# Patient Record
Sex: FEMALE | Race: WHITE | NOT HISPANIC OR LATINO | Employment: UNEMPLOYED | URBAN - METROPOLITAN AREA
[De-identification: names, ages, dates, MRNs, and addresses within clinical notes are randomized per-mention and may not be internally consistent; named-entity substitution may affect disease eponyms.]

---

## 2017-01-30 DIAGNOSIS — Z12.31 ENCOUNTER FOR SCREENING MAMMOGRAM FOR MALIGNANT NEOPLASM OF BREAST: ICD-10-CM

## 2017-01-31 ENCOUNTER — ALLSCRIPTS OFFICE VISIT (OUTPATIENT)
Dept: OTHER | Facility: OTHER | Age: 47
End: 2017-01-31

## 2017-01-31 ENCOUNTER — LAB REQUISITION (OUTPATIENT)
Dept: LAB | Facility: HOSPITAL | Age: 47
End: 2017-01-31
Payer: COMMERCIAL

## 2017-01-31 DIAGNOSIS — Z01.419 ENCOUNTER FOR GYNECOLOGICAL EXAMINATION WITHOUT ABNORMAL FINDING: ICD-10-CM

## 2017-01-31 PROCEDURE — G0145 SCR C/V CYTO,THINLAYER,RESCR: HCPCS | Performed by: NURSE PRACTITIONER

## 2017-01-31 PROCEDURE — 87624 HPV HI-RISK TYP POOLED RSLT: CPT | Performed by: NURSE PRACTITIONER

## 2017-02-03 LAB — HPV RRNA GENITAL QL NAA+PROBE: ABNORMAL

## 2017-02-04 LAB
LAB AP GYN PRIMARY INTERPRETATION: NORMAL
LAB AP LMP: NORMAL
Lab: NORMAL
PATH INTERP SPEC-IMP: NORMAL

## 2017-02-10 ENCOUNTER — GENERIC CONVERSION - ENCOUNTER (OUTPATIENT)
Dept: OTHER | Facility: OTHER | Age: 47
End: 2017-02-10

## 2017-02-15 ENCOUNTER — GENERIC CONVERSION - ENCOUNTER (OUTPATIENT)
Dept: OTHER | Facility: OTHER | Age: 47
End: 2017-02-15

## 2017-03-09 ENCOUNTER — ALLSCRIPTS OFFICE VISIT (OUTPATIENT)
Dept: OTHER | Facility: OTHER | Age: 47
End: 2017-03-09

## 2017-03-09 PROCEDURE — 88305 TISSUE EXAM BY PATHOLOGIST: CPT | Performed by: NURSE PRACTITIONER

## 2017-03-09 PROCEDURE — 88344 IMHCHEM/IMCYTCHM EA MLT ANTB: CPT | Performed by: NURSE PRACTITIONER

## 2017-03-10 ENCOUNTER — LAB REQUISITION (OUTPATIENT)
Dept: LAB | Facility: HOSPITAL | Age: 47
End: 2017-03-10
Payer: COMMERCIAL

## 2017-03-10 DIAGNOSIS — R87.610 ATYPICAL SQUAMOUS CELLS OF UNDETERMINED SIGNIFICANCE ON CYTOLOGIC SMEAR OF CERVIX (ASC-US): ICD-10-CM

## 2017-03-22 ENCOUNTER — ALLSCRIPTS OFFICE VISIT (OUTPATIENT)
Dept: OTHER | Facility: OTHER | Age: 47
End: 2017-03-22

## 2017-03-24 ENCOUNTER — GENERIC CONVERSION - ENCOUNTER (OUTPATIENT)
Dept: OTHER | Facility: OTHER | Age: 47
End: 2017-03-24

## 2018-01-10 NOTE — PROCEDURES
Assessment    1  ASCUS with positive high risk HPV cervical (795 01,795 05) (R87 610,R87 810)    Plan  ASCUS with positive high risk HPV cervical    · (1) TISSUE EXAM; Status:Active - Retrospective Authorization; Requested LGR:[id number];    Perform:St  [address] [place] [address]; JRI:[id number]; Last Updated By:Bela Coleman; 3/9/2017 12:03:43 PM;Ordered; For:ASCUS with positive high risk HPV cervical; Ordered By:Christiana Cabral;  B : 9 O'CLOCK AREA OF CERVIX  B Date/Time: : [id number]  B : Cervical Biopsy  A : ENDOCERVIX  A Date/Time: : 09PTM8190  A : Endocervical curetting  Impression: : ASCUS (+)HPV       R87 61 , R87 810   · Follow-up visit in 2 weeks Evaluation and Treatment  Follow-up  Status: Hold For -  Scheduling  Requested for: [id number]   Ordered; For: ASCUS with positive high risk HPV cervical; Ordered By: Joel Araujo Performed:  Due: 29AKU8257    Discussion/Summary  Discussion Summary:   We again discussed the abnormal pap smear and the implications of this  She is aware that a biopsy and ECC were done and these results are pending  We did discuss ways she can boost her immune system  Chief Complaint  Pt  presents today for a colposcopy  Active Problems    1  ASCUS with positive high risk HPV cervical (795 01,795 05) (R87 610,R87 810)   2  Encounter for routine gynecological examination with Papanicolaou smear of cervix   (V72 31,V76 2) (Z01 419)   3  Encounter for screening mammogram for malignant neoplasm of breast (V76 12)   (Z12 31)   4  Human papilloma virus infection (079 4) (B97 7)    Current Meds    1  Calcium 600+D TABS; Therapy: (Recorded:31Jan2017) to Recorded   2  Escitalopram Oxalate 10 MG Oral Tablet; Therapy: (Recorded:31Jan2017) to Recorded   3  Fish Oil 1200 MG Oral Capsule; Therapy: (Recorded:31Jan2017) to Recorded   4  Losartan Potassium 50 MG Oral Tablet; Therapy: [id number] to (Last Rx:07Mar2012)  Requested for: 98QDR8199 Ordered    Allergies    1  Sulfa Drugs    Procedure    Procedure: colposcopy  Indication: atypical squamous cells of undetermined significance and PCR positive for high risk HPV  Risks and benefits were discussed with the patient  We discussed possible complications, including infection and bleeding  Written consent was obtained prior to the procedure  The patient was premedicated with None  Procedure Note:   A cervical Pap smear was not performed  The squamocolumnar junction was not fully visualized  After bathing the cervix in acetic acid, evaluation showed acetowhite changes at 9 o'clock  Cervical Biopsy: one biopsies taken of the cervix  the biopsies were taken at 9 o'clock  Endocervical curettage was performed  Hemostasis was obtained with Monsel's solution  Patient Status: the patient tolerated the procedure well  Complications: there were no complications  Future Appointments    Date/Time Provider Specialty Site   96/62/9391 98:50 PM Arlen Zamora [facility]     History of Present Illness  The pt  has had abnormal pap smears since about 11/2013  Her pap smear results keep showing ASCUS with positive "other " HPV  A colposcopy with biopsy was done 2/2015  The biopsy results showed BRANDON 1  She relates that she continues to have increased stress in her life which may be affecting her immune system overall       Review of Systems  Constitutional : denies fever, fatigue, recent weight gain or loss   : as noted in HPI     Signatures   Electronically signed by : Velvet Quiñones; Mar  9 2017 12:12PM EST                       (Author)    Electronically signed by : ALEXA Montoya Mar  9 2017  2:10PM EST

## 2018-01-12 VITALS
DIASTOLIC BLOOD PRESSURE: 80 MMHG | BODY MASS INDEX: 31.98 KG/M2 | SYSTOLIC BLOOD PRESSURE: 124 MMHG | HEIGHT: 66 IN | WEIGHT: 199 LBS

## 2018-01-13 VITALS
SYSTOLIC BLOOD PRESSURE: 124 MMHG | HEIGHT: 66 IN | BODY MASS INDEX: 31.98 KG/M2 | DIASTOLIC BLOOD PRESSURE: 80 MMHG | WEIGHT: 199 LBS

## 2018-01-13 VITALS
HEIGHT: 66 IN | BODY MASS INDEX: 31.98 KG/M2 | SYSTOLIC BLOOD PRESSURE: 124 MMHG | DIASTOLIC BLOOD PRESSURE: 80 MMHG | WEIGHT: 199 LBS

## 2018-01-17 NOTE — RESULT NOTES
Verified Results  (1) THIN PREP PAP WITH IMAGING [id number] 32:14NL Neftali Jackson Order Number: [id number]     Test Name Result Flag Reference   LAB AP CASE REPORT (Report)     Gynecologic Cytology Report            Case: [id number]                  Authorizing Provider: DAVID Aragon    Collected:      01/31/2017 6191        First Screen:     Colleen Jimenez, CT   Received:      02/01/2017 1014        Rescreen:       Michael Lenny, CT                           Pathologist:      Georgina Khan MD                              Specimen:  LIQUID-BASED PAP, SCREENING, Cervix   HPV HIGH RISK RESULT (Report)     HPV, High Risk: HPV POS, HPV16 NEG, HPV18 NEG      Other High Risk HPV Positive, HPV 16 Negative, HPV 18 Negative  Specimen is positive for the DNA of any one of, or combination of the following high risk HPV types: 11,99,32,80,71,82,82,00,29,23,63,30  HPV types 16 and 18 DNA were undetectable or below the pre-set threshold  [employer]?s FDA approved Neris 4800 is utilized with strict adherence to the ?s instruction  manual to test for the presence of High-Risk HPV DNA, as well as HPV 16 and HPV 18  This instrument  has been validated by our laboratory and/or by the   A negative result does not preclude the presence of HPV infection because results depend on adequate  specimen collection, absence of inhibitors and sufficient DNA to be detected  Additionally, HPV negative  results are not intended to prevent women from proceeding to colposcopy if clinically warranted  Positive HPV test results indicate the presence of any one or more of the high risk types, but since patients  are often co-infected with low-risk types it does not rule out the presence of low-risk types in patients  with mixed infections     LAB AP GYN PRIMARY INTERPRETATION      Epithelial cell abnormality  Electronically signed by Georgina Khan MD on 2/4/2017 at 1:15 PM   LAB AP GYN INTERPRETATION      Atypical squamous cells of undetermined significance   LAB AP GYN SPECIMEN ADEQUACY      Satisfactory for evaluation  Endocervical/transformation zone component present  LAB AP GYN ADDITIONAL INFORMATION (Report)     [employer]'s FDA approved ,  and ThinPrep Imaging System are   utilized with strict adherence to the 's instruction manual to   prepare gynecologic and non-gynecologic cytology specimens for the   production of ThinPrep slides as well as for gynecologic ThinPrep imaging  These processes have been validated by our laboratory and/or by the     The Pap test is not a diagnostic procedure and should not be used as the   sole means to detect cervical cancer  It is only a screening procedure to   aid in the detection of cervical cancer and its precursors  Both   false-negative and false-positive results have been experienced  Your   patient's test result should be interpreted in this context together with   the history and clinical findings  LAB AP LMP 1/11/2017     Performing Comments: CERVICAL - ENDOCERVICAL  ROUTINE PAP   COTEST REGARDLESS   LAB AP CASE REPORT (Report)     Gynecologic Cytology Report            Case: [id number]                  Authorizing Provider: DAVID Anidno    Collected:      01/31/2017 1326        First Screen:     JANNET Clement   Received:      02/01/2017 1014        Rescreen:       JANNET Joyner                           Pathologist:      Jaclyn Ro MD                              Specimen:  LIQUID-BASED PAP, SCREENING, Cervix   HPV HIGH RISK RESULT (Report)     HPV, High Risk: HPV POS, HPV16 NEG, HPV18 NEG      Other High Risk HPV Positive, HPV 16 Negative, HPV 18 Negative  Specimen is positive for the DNA of any one of, or combination of the following high risk HPV types: 59,30,14,18,09,34,24,11,40,25,75,09    HPV types 16 and 18 DNA were undetectable or below the pre-set threshold  [employer]?s FDA approved Neris 4800 is utilized with strict adherence to the ?s instruction  manual to test for the presence of High-Risk HPV DNA, as well as HPV 16 and HPV 18  This instrument  has been validated by our laboratory and/or by the   A negative result does not preclude the presence of HPV infection because results depend on adequate  specimen collection, absence of inhibitors and sufficient DNA to be detected  Additionally, HPV negative  results are not intended to prevent women from proceeding to colposcopy if clinically warranted  Positive HPV test results indicate the presence of any one or more of the high risk types, but since patients  are often co-infected with low-risk types it does not rule out the presence of low-risk types in patients  with mixed infections  LAB AP GYN PRIMARY INTERPRETATION      Epithelial cell abnormality  Electronically signed by Prema Monte MD on 2/4/2017 at 1:15 PM   LAB AP GYN INTERPRETATION      Atypical squamous cells of undetermined significance   LAB AP GYN SPECIMEN ADEQUACY      Satisfactory for evaluation  Endocervical/transformation zone component present  LAB AP GYN ADDITIONAL INFORMATION (Report)     [employer]'s FDA approved ,  and ThinPrep Imaging System are   utilized with strict adherence to the 's instruction manual to   prepare gynecologic and non-gynecologic cytology specimens for the   production of ThinPrep slides as well as for gynecologic ThinPrep imaging  These processes have been validated by our laboratory and/or by the     The Pap test is not a diagnostic procedure and should not be used as the   sole means to detect cervical cancer  It is only a screening procedure to   aid in the detection of cervical cancer and its precursors  Both   false-negative and false-positive results have been experienced   Your   patient's test result should be interpreted in this context together with   the history and clinical findings     LAB AP LMP 1/11/2017     Performing Comments: CERVICAL - ENDOCERVICAL  ROUTINE PAP   COTEST REGARDLESS                   Plan  Encounter for screening mammogram for malignant neoplasm of breast    · MAMMO SCREENING BILATERAL W 3D & CAD; Status:Hold For - Scheduling; Requested  for:30Jan2017;

## 2018-01-18 NOTE — RESULT NOTES
Verified Results  (1) TISSUE EXAM [id number] 56:52WX Lucius Janice     Test Name Result Flag Reference   LAB AP CASE REPORT (Report)     Surgical Pathology Report             Case: [id number]                   Authorizing Provider: DAVID Romeo    Collected:      03/09/2017           Pathologist:      Rupert Justice MD       Received:      03/10/2017 1520        Specimens:  A) - Endocervical, ECC                                         B) - Cervix, 9 o'clock   LAB AP FINAL DIAGNOSIS (Report)     A  Endocervix (curetting):    - Scant benign endocervical glandular and ectocervical squamous mucosa  - Background of mucin noted  - No dysplasia or malignancy identified  B  9:00, cervix (biopsy):    - Transition zone mucosa with focal mild squamous atypia, cannot   exclude low-grade squamous intraepithelial lesion (BRANDON-1)  - Background of focal acute cervicitis  - No high-grade dysplasia or malignancy identified  Electronically signed by Rupert Justice MD on 3/16/2017 at 12:02 PM  Preliminary result electronically signed by Rupert Justice MD on 3/15/2017 at 9:46 AM   LAB AP MICROSCOPIC DESCRIPTION      - Immunohistochemical studies (with appropriate controls) demonstrate:    - Combined P16 / Ki-67 stain performed on the 9:00 cervical biopsy   (part B) shows weak / patchy P16 expression with mild to moderate Ki-67   proliferation  LAB AP NOTE      Interpretation performed at [facility], [address] [facility], [address] [facility] [facility]  LAB AP SURGICAL ADDITIONAL INFORMATION (Report)     These tests were developed and their performance characteristics   determined by [facility] or [facility]  They may not be cleared or approved by the [place]  Food and   Drug Administration  The FDA has determined that such clearance or   approval is not necessary  These tests are used for clinical purposes  They should not be regarded as investigational or for research   This   laboratory has been approved by [facility], designated as a high-complexity   laboratory and is qualified to perform these tests  LAB AP GROSS DESCRIPTION (Report)     A  The specimen is received in formalin, labeled with the patient's name   and hospital number, and is designated endocervical curettings  The   specimen consists of multiple red to brown soft and mucinous tissue   fragments measuring in aggregate 2 5 x 2 2 x 0 3 cm  Entirely submitted  One cassette  B  The specimen is received in formalin, labeled with the patient's name   and hospital number, and is designated 9 o'clock area of cervix  The   specimen consists of multiple white soft and mucinous tissue fragments   measuring in aggregate 0 8 x 0 5 x 0 2 cm  Entirely submitted  One   cassette  Note: The estimated total formalin fixation time based upon information   provided by the submitting clinician and the standard processing schedule   is over 72 hours      BMV   LAB AP CLINICAL INFORMATION      Atypical squamous cells of undetermined significance on cytologic smear of cervix (ASC-US)

## 2018-02-28 ENCOUNTER — OFFICE VISIT (OUTPATIENT)
Dept: GYNECOLOGY | Facility: CLINIC | Age: 48
End: 2018-02-28
Payer: COMMERCIAL

## 2018-02-28 VITALS
HEIGHT: 65 IN | SYSTOLIC BLOOD PRESSURE: 124 MMHG | DIASTOLIC BLOOD PRESSURE: 78 MMHG | BODY MASS INDEX: 31.42 KG/M2 | WEIGHT: 188.6 LBS

## 2018-02-28 DIAGNOSIS — Z12.31 ENCOUNTER FOR SCREENING MAMMOGRAM FOR MALIGNANT NEOPLASM OF BREAST: ICD-10-CM

## 2018-02-28 DIAGNOSIS — Z01.419 ENCOUNTER FOR ROUTINE GYNECOLOGICAL EXAMINATION WITH PAPANICOLAOU SMEAR OF CERVIX: Primary | ICD-10-CM

## 2018-02-28 PROBLEM — N87.0 CERVICAL INTRAEPITHELIAL NEOPLASIA GRADE 1: Status: ACTIVE | Noted: 2017-03-22

## 2018-02-28 PROCEDURE — 99396 PREV VISIT EST AGE 40-64: CPT | Performed by: NURSE PRACTITIONER

## 2018-02-28 RX ORDER — ESCITALOPRAM OXALATE 10 MG/1
TABLET ORAL
COMMUNITY
Start: 2018-02-26

## 2018-02-28 RX ORDER — AMOXICILLIN 500 MG
CAPSULE ORAL
COMMUNITY

## 2018-02-28 RX ORDER — LOSARTAN POTASSIUM 25 MG/1
25 TABLET ORAL
COMMUNITY
Start: 2018-02-26 | End: 2022-06-02

## 2018-02-28 NOTE — PROGRESS NOTES
Reynold Finn is a 50 y o  female who presents for her annual exam  Periods are irregular, lasting 4 days  Her LMP was 12/22/2017 and PLMP was 8/2017  Dysmenorrhea:none  Cyclic symptoms include none  No intermenstrual bleeding, spotting, or discharge  She denies being sexually active at the present time  Current contraception: none  History of abnormal Pap smear: yes - approx  2013 - ASCUS positive HPV  Family history of breast cancer: no  Past Medical History:   Diagnosis Date    ASCUS with positive high risk HPV cervical      No family history on file  Past Surgical History:   Procedure Laterality Date    COLPOSCOPY      TOTAL HIP ARTHROPLASTY       Social History     Social History    Marital status: /Civil Union     Spouse name: N/A    Number of children: N/A    Years of education: N/A     Occupational History    Not on file  Social History Main Topics    Smoking status: Current Every Day Smoker    Smokeless tobacco: Never Used    Alcohol use Yes      Comment: SOCIAL    Drug use: No    Sexual activity: Not on file     Other Topics Concern    Not on file     Social History Narrative    No narrative on file       Current Outpatient Prescriptions:     escitalopram (LEXAPRO) 10 mg tablet, TAKE 1 TABLET (10 MG TOTAL) BY MOUTH DAILY  , Disp: , Rfl:     losartan (COZAAR) 25 mg tablet, Take 25 mg by mouth, Disp: , Rfl:     Calcium Carb-Cholecalciferol 600-800 MG-UNIT TABS, Take by mouth, Disp: , Rfl:     Omega-3 Fatty Acids (FISH OIL) 1200 MG CAPS, Take by mouth, Disp: , Rfl:       Review of Systems  Constitutional :no fever, feels well, no tiredness, no recent weight gain or loss  Cardiovascular: no complaints of slow or fast heart beat, no chest pain, no palpitations  Respiratory: no complaints of shortness of shortness of breath, no SIMS  Breasts:no complaints of breast pain, breast lump, or nipple discharge  Gastrointestinal: no complaints of abdominal pain, constipation,nause, vomiting, or diarrhea or bloody stools  Genitourinary : no complaints of dysuria, incontinence, pelvic pain, dysmenorrhea,vaginal discharge or abnormal vaginal bleeding  Reports irregular menses over the past year  Integumentary: no complaints of skin rash or lesion,itching or dry skin  Neurological: no complaints of headache,numbness, tingling, dizziness or fainting       Objective      /78   Ht 5' 5 2" (1 656 m)   Wt 85 5 kg (188 lb 9 6 oz)   BMI 31 19 kg/m²     General:   appears stated age","cooperative","alert" normal mood and affect   Neck: Neck: normal, supple,trachea midline, no masses   Heart: regular rate and rhythm, S1, S2 normal, no murmur, click, rub or gallop   Lungs: clear to auscultation bilaterally   Breasts: Breast exam :normal, no dimpling or skin changes noted   Abdomen: soft, non-tender, without masses or organomegaly   Vulva: Normal , no lesions   Vagina: normal , no lesions or dryness   Urethra: normal   Cervix: Normal, no palpable masses A pap smear was done   Uterus: Normal , non-tender,not enlarged,no palpable masses   Adnexa: Normal, non-tender without fullness or masses                         Assessment     Normal GYN exam     Plan      All questions answered  Await pap smear results  Breast self exam technique reviewed and patient encouraged to perform self-exam monthly  Dietary diary  Mammogram   Thin prep Pap smear  We discussed her irregular menses and perimenopause vs  menopause and what to expect  She will continue to track her menses and WCB with any unusual bleeding or sx  no

## 2018-03-02 LAB
CLINICAL INFO: NORMAL
CYTO CVX: NORMAL
DATE PREVIOUS BX: NORMAL
LMP START DATE: NORMAL
SL AMB PREV. PAP:: NORMAL
SPECIMEN SOURCE CVX/VAG CYTO: NORMAL

## 2019-04-02 DIAGNOSIS — Z12.31 ENCOUNTER FOR SCREENING MAMMOGRAM FOR MALIGNANT NEOPLASM OF BREAST: Primary | ICD-10-CM

## 2019-04-03 ENCOUNTER — ANNUAL EXAM (OUTPATIENT)
Dept: GYNECOLOGY | Facility: CLINIC | Age: 49
End: 2019-04-03
Payer: COMMERCIAL

## 2019-04-03 VITALS
HEART RATE: 68 BPM | SYSTOLIC BLOOD PRESSURE: 124 MMHG | DIASTOLIC BLOOD PRESSURE: 82 MMHG | BODY MASS INDEX: 26.61 KG/M2 | WEIGHT: 165.6 LBS | HEIGHT: 66 IN

## 2019-04-03 DIAGNOSIS — Z01.419 ENCOUNTER FOR GYNECOLOGICAL EXAMINATION (GENERAL) (ROUTINE) WITHOUT ABNORMAL FINDINGS: Primary | ICD-10-CM

## 2019-04-03 DIAGNOSIS — Z12.31 ENCOUNTER FOR SCREENING MAMMOGRAM FOR MALIGNANT NEOPLASM OF BREAST: ICD-10-CM

## 2019-04-03 DIAGNOSIS — Z12.4 ENCOUNTER FOR PAPANICOLAOU SMEAR FOR CERVICAL CANCER SCREENING: ICD-10-CM

## 2019-04-03 PROCEDURE — 87624 HPV HI-RISK TYP POOLED RSLT: CPT | Performed by: OBSTETRICS & GYNECOLOGY

## 2019-04-03 PROCEDURE — S0612 ANNUAL GYNECOLOGICAL EXAMINA: HCPCS | Performed by: OBSTETRICS & GYNECOLOGY

## 2019-04-03 PROCEDURE — G0145 SCR C/V CYTO,THINLAYER,RESCR: HCPCS | Performed by: OBSTETRICS & GYNECOLOGY

## 2019-04-04 LAB
HPV HR 12 DNA CVX QL NAA+PROBE: NEGATIVE
HPV16 DNA CVX QL NAA+PROBE: NEGATIVE
HPV18 DNA CVX QL NAA+PROBE: NEGATIVE

## 2019-04-08 LAB
LAB AP GYN PRIMARY INTERPRETATION: NORMAL
Lab: NORMAL

## 2019-04-15 DIAGNOSIS — Z12.31 ENCOUNTER FOR SCREENING MAMMOGRAM FOR MALIGNANT NEOPLASM OF BREAST: ICD-10-CM

## 2020-05-20 ENCOUNTER — ANNUAL EXAM (OUTPATIENT)
Dept: GYNECOLOGY | Facility: CLINIC | Age: 50
End: 2020-05-20
Payer: COMMERCIAL

## 2020-05-20 VITALS
BODY MASS INDEX: 25.49 KG/M2 | SYSTOLIC BLOOD PRESSURE: 110 MMHG | HEIGHT: 66 IN | WEIGHT: 158.6 LBS | DIASTOLIC BLOOD PRESSURE: 72 MMHG | HEART RATE: 76 BPM

## 2020-05-20 DIAGNOSIS — Z12.31 SCREENING MAMMOGRAM, ENCOUNTER FOR: ICD-10-CM

## 2020-05-20 DIAGNOSIS — Z01.411 ENCOUNTER FOR GYNECOLOGICAL EXAMINATION (GENERAL) (ROUTINE) WITH ABNORMAL FINDINGS: Primary | ICD-10-CM

## 2020-05-20 DIAGNOSIS — D25.9 UTERINE LEIOMYOMA, UNSPECIFIED LOCATION: ICD-10-CM

## 2020-05-20 DIAGNOSIS — Z12.4 ENCOUNTER FOR PAPANICOLAOU SMEAR FOR CERVICAL CANCER SCREENING: ICD-10-CM

## 2020-05-20 PROCEDURE — G0145 SCR C/V CYTO,THINLAYER,RESCR: HCPCS | Performed by: OBSTETRICS & GYNECOLOGY

## 2020-05-20 PROCEDURE — S0612 ANNUAL GYNECOLOGICAL EXAMINA: HCPCS | Performed by: OBSTETRICS & GYNECOLOGY

## 2020-05-20 PROCEDURE — 87624 HPV HI-RISK TYP POOLED RSLT: CPT | Performed by: OBSTETRICS & GYNECOLOGY

## 2020-05-20 RX ORDER — DIPHENOXYLATE HYDROCHLORIDE AND ATROPINE SULFATE 2.5; .025 MG/1; MG/1
1 TABLET ORAL DAILY
COMMUNITY

## 2020-05-29 LAB
LAB AP GYN PRIMARY INTERPRETATION: NORMAL
Lab: NORMAL

## 2020-06-17 ENCOUNTER — TELEPHONE (OUTPATIENT)
Dept: GYNECOLOGY | Facility: CLINIC | Age: 50
End: 2020-06-17

## 2020-06-17 NOTE — TELEPHONE ENCOUNTER
Called patient to determine if she had TVU done  She was in a store and could not talk  She will call back when she can

## 2020-07-22 ENCOUNTER — HOSPITAL ENCOUNTER (OUTPATIENT)
Dept: ULTRASOUND IMAGING | Facility: MEDICAL CENTER | Age: 50
Discharge: HOME | End: 2020-07-22
Payer: COMMERCIAL

## 2020-07-22 DIAGNOSIS — D25.9 UTERINE LEIOMYOMA, UNSPECIFIED LOCATION: ICD-10-CM

## 2020-07-22 PROCEDURE — 76830 TRANSVAGINAL US NON-OB: CPT

## 2020-07-22 PROCEDURE — 76856 US EXAM PELVIC COMPLETE: CPT

## 2020-10-14 DIAGNOSIS — Z12.31 SCREENING MAMMOGRAM, ENCOUNTER FOR: ICD-10-CM

## 2021-04-13 DIAGNOSIS — Z23 ENCOUNTER FOR IMMUNIZATION: ICD-10-CM

## 2021-04-26 ENCOUNTER — IMMUNIZATIONS (OUTPATIENT)
Dept: FAMILY MEDICINE CLINIC | Facility: HOSPITAL | Age: 51
End: 2021-04-26

## 2021-04-26 DIAGNOSIS — Z23 ENCOUNTER FOR IMMUNIZATION: Primary | ICD-10-CM

## 2021-04-26 PROCEDURE — 0011A SARS-COV-2 / COVID-19 MRNA VACCINE (MODERNA) 100 MCG: CPT

## 2021-04-26 PROCEDURE — 91301 SARS-COV-2 / COVID-19 MRNA VACCINE (MODERNA) 100 MCG: CPT

## 2021-05-18 NOTE — PROGRESS NOTES
Assessment/Plan:    Benign findings on routine gyn exam  Recommended monthly SBE, annual CBE and annual screening mammo  ASCCP guidelines reviewed and pap with cotesting done today  Colonoscopy noted to be up to date  The patient denies STI risk factors and declines testing at this time  Reviewed diet/activity recommendations Calcium 9479-4783 mg and Vit D 600-1000 IU daily  Discussed postmenopausal considerations and symptoms to report  Kegel exercises as instructed  RTO in one year for routine annual gyn exam or sooner PRN  Diagnoses and all orders for this visit:    Encounter for gynecological examination (general) (routine) without abnormal findings        Subjective:      Patient ID: Jhon Berman is a 46 y o  female  This patient presents for routine annual gyn exam   She denies acute gyn complaints  She denies  bleeding or spotting, VM sx, pelvic pain, dyspareunia, breast concerns, abnormal discharge, bowel/bladder dysfunction  , sexually active and is monogamous  Pap/HPV up to date and normal, 5/20/20  Pap/HPV up to date and normal, 4/3/19  Has hx of ASCUS pap with +HPV in 2017 with BRANDON I on colpo  Pap in 2018 was normal   Mammography up to date and normal, 10/12/20  Colonoscopy up to date, 3/29/19  Due again after 5 years  The following portions of the patient's history were reviewed and updated as appropriate: allergies, current medications, past family history, past medical history, past social history, past surgical history and problem list     Review of Systems   Constitutional: Negative  Respiratory: Negative  Cardiovascular: Negative  Gastrointestinal: Negative  Endocrine: Negative  Genitourinary: Negative for dyspareunia, dysuria, frequency, pelvic pain, urgency, vaginal bleeding, vaginal discharge and vaginal pain  Musculoskeletal: Negative  Skin: Negative  Neurological: Negative  Psychiatric/Behavioral: Negative  Objective:      /82   Ht 5' 5 25" (1 657 m)   Wt 74 2 kg (163 lb 8 oz)   BMI 27 00 kg/m²          Physical Exam  Vitals signs and nursing note reviewed  Exam conducted with a chaperone present  Constitutional:       Appearance: Normal appearance  She is well-developed  HENT:      Head: Normocephalic and atraumatic  Neck:      Musculoskeletal: Normal range of motion and neck supple  Thyroid: No thyroid mass or thyromegaly  Cardiovascular:      Rate and Rhythm: Normal rate and regular rhythm  Heart sounds: Normal heart sounds  Pulmonary:      Effort: Pulmonary effort is normal       Breath sounds: Normal breath sounds  Chest:      Breasts: Breasts are symmetrical          Right: No inverted nipple, mass, nipple discharge, skin change or tenderness  Left: No inverted nipple, mass, nipple discharge, skin change or tenderness  Abdominal:      General: Bowel sounds are normal       Palpations: Abdomen is soft  Tenderness: There is no abdominal tenderness  Hernia: There is no hernia in the left inguinal area or right inguinal area  Genitourinary:     General: Normal vulva  Exam position: Supine  Pubic Area: No rash  Labia:         Right: No rash, tenderness, lesion or injury  Left: No rash, tenderness, lesion or injury  Urethra: No prolapse, urethral pain, urethral swelling or urethral lesion  Vagina: Normal  No signs of injury and foreign body  No vaginal discharge, erythema, tenderness, bleeding, lesions or prolapsed vaginal walls  Cervix: No cervical motion tenderness, discharge, friability, lesion, erythema, cervical bleeding or eversion  Uterus: Not deviated, not enlarged, not fixed, not tender and no uterine prolapse  Adnexa:         Right: No mass, tenderness or fullness  Left: No mass, tenderness or fullness  Rectum: No external hemorrhoid        Comments: Urethra normal without lesions  No bladder tenderness  Musculoskeletal: Normal range of motion  Lymphadenopathy:      Lower Body: No right inguinal adenopathy  No left inguinal adenopathy  Skin:     General: Skin is warm and dry  Neurological:      Mental Status: She is alert and oriented to person, place, and time  Psychiatric:         Speech: Speech normal          Behavior: Behavior normal  Behavior is cooperative

## 2021-05-21 ENCOUNTER — ANNUAL EXAM (OUTPATIENT)
Dept: GYNECOLOGY | Facility: CLINIC | Age: 51
End: 2021-05-21
Payer: COMMERCIAL

## 2021-05-21 VITALS
WEIGHT: 163.5 LBS | SYSTOLIC BLOOD PRESSURE: 112 MMHG | DIASTOLIC BLOOD PRESSURE: 82 MMHG | HEIGHT: 65 IN | BODY MASS INDEX: 27.24 KG/M2

## 2021-05-21 DIAGNOSIS — Z12.4 ENCOUNTER FOR PAPANICOLAOU SMEAR FOR CERVICAL CANCER SCREENING: ICD-10-CM

## 2021-05-21 DIAGNOSIS — Z01.419 ENCOUNTER FOR GYNECOLOGICAL EXAMINATION (GENERAL) (ROUTINE) WITHOUT ABNORMAL FINDINGS: Primary | ICD-10-CM

## 2021-05-21 DIAGNOSIS — Z12.4 ENCOUNTER FOR PAPANICOLAOU SMEAR FOR CERVICAL CANCER SCREENING: Primary | ICD-10-CM

## 2021-05-21 PROCEDURE — G0145 SCR C/V CYTO,THINLAYER,RESCR: HCPCS | Performed by: OBSTETRICS & GYNECOLOGY

## 2021-05-21 PROCEDURE — 1036F TOBACCO NON-USER: CPT | Performed by: OBSTETRICS & GYNECOLOGY

## 2021-05-21 PROCEDURE — 87624 HPV HI-RISK TYP POOLED RSLT: CPT | Performed by: OBSTETRICS & GYNECOLOGY

## 2021-05-21 PROCEDURE — 99396 PREV VISIT EST AGE 40-64: CPT | Performed by: OBSTETRICS & GYNECOLOGY

## 2021-05-21 PROCEDURE — 3008F BODY MASS INDEX DOCD: CPT | Performed by: OBSTETRICS & GYNECOLOGY

## 2021-05-26 LAB
LAB AP GYN PRIMARY INTERPRETATION: NORMAL
Lab: NORMAL

## 2021-06-02 ENCOUNTER — IMMUNIZATIONS (OUTPATIENT)
Dept: FAMILY MEDICINE CLINIC | Facility: HOSPITAL | Age: 51
End: 2021-06-02

## 2021-06-02 DIAGNOSIS — Z23 ENCOUNTER FOR IMMUNIZATION: Primary | ICD-10-CM

## 2021-06-02 PROCEDURE — 91301 SARS-COV-2 / COVID-19 MRNA VACCINE (MODERNA) 100 MCG: CPT

## 2021-06-02 PROCEDURE — 0012A SARS-COV-2 / COVID-19 MRNA VACCINE (MODERNA) 100 MCG: CPT

## 2022-06-01 NOTE — PROGRESS NOTES
Assessment/Plan:    Recommended monthly SBE, annual CBE and annual screening mammo  ASCCP guidelines reviewed and pap with cotesting noted to be up to date; this low risk patient was advised she meets criteria to d/c pap screening at age 70  No pap done today  Colonoscopy noted to be up to date  Reviewed diet/activity recommendations Calcium 8626-3254 mg and Vit D 600-1000 IU daily  Discussed postmenopausal considerations and symptoms to report  Kegel exercises as instructed  RTO in one year for routine annual gyn exam or sooner PRN  Diagnoses and all orders for this visit:    Encounter for gynecological examination (general) (routine) without abnormal findings        Subjective:      Patient ID: Dania Marmolejo is a 45 y o  female  This patient presents for routine annual gyn exam   She denies acute gyn complaints  She denies  bleeding or spotting, VM sx, pelvic pain, dyspareunia, breast concerns, abnormal discharge, bowel/bladder dysfunction  , sexually active and is monogamous  Pap/HPV up to date and normal, 5/21/21, 2020  Jessie Lowe Has hx of ASCUS pap with +HPV in 2017 with BRANDON I on colpo  Mammography up to date and normal, 10/12/20  Colonoscopy up to date, 3/29/19  Due again after 5 years  The following portions of the patient's history were reviewed and updated as appropriate: allergies, current medications, past family history, past medical history, past social history, past surgical history and problem list     Review of Systems   Constitutional: Negative  Respiratory: Negative  Cardiovascular: Negative  Gastrointestinal: Negative  Endocrine: Negative  Genitourinary: Negative for dyspareunia, dysuria, frequency, pelvic pain, urgency, vaginal bleeding, vaginal discharge and vaginal pain  Musculoskeletal: Negative  Skin: Negative  Neurological: Negative  Psychiatric/Behavioral: Negative            Objective:      /78   Pulse 69   Ht 5' 5 25" (1 657 m)   Wt 75 1 kg (165 lb 9 6 oz)   LMP 02/09/2019   BMI 27 35 kg/m²          Physical Exam  Vitals and nursing note reviewed  Exam conducted with a chaperone present  Constitutional:       Appearance: Normal appearance  She is well-developed  HENT:      Head: Normocephalic and atraumatic  Neck:      Thyroid: No thyroid mass or thyromegaly  Cardiovascular:      Rate and Rhythm: Normal rate and regular rhythm  Heart sounds: Normal heart sounds  Pulmonary:      Effort: Pulmonary effort is normal       Breath sounds: Normal breath sounds  Chest:   Breasts: Breasts are symmetrical       Right: No inverted nipple, mass, nipple discharge, skin change or tenderness  Left: No inverted nipple, mass, nipple discharge, skin change or tenderness  Abdominal:      General: Bowel sounds are normal       Palpations: Abdomen is soft  Tenderness: There is no abdominal tenderness  Hernia: There is no hernia in the left inguinal area or right inguinal area  Genitourinary:     General: Normal vulva  Exam position: Supine  Pubic Area: No rash  Labia:         Right: No rash, tenderness, lesion or injury  Left: No rash, tenderness, lesion or injury  Urethra: No prolapse, urethral pain, urethral swelling or urethral lesion  Vagina: Normal  No signs of injury and foreign body  No vaginal discharge, erythema, tenderness, bleeding, lesions or prolapsed vaginal walls  Cervix: No cervical motion tenderness, discharge, friability, lesion, erythema, cervical bleeding or eversion  Uterus: Not deviated, not enlarged, not fixed, not tender and no uterine prolapse  Adnexa:         Right: No mass, tenderness or fullness  Left: No mass, tenderness or fullness  Rectum: No external hemorrhoid  Comments: Urethra normal without lesions  No bladder tenderness  Musculoskeletal:         General: Normal range of motion        Cervical back: Normal range of motion and neck supple  Lymphadenopathy:      Lower Body: No right inguinal adenopathy  No left inguinal adenopathy  Skin:     General: Skin is warm and dry  Neurological:      Mental Status: She is alert and oriented to person, place, and time  Psychiatric:         Speech: Speech normal          Behavior: Behavior normal  Behavior is cooperative

## 2022-06-02 ENCOUNTER — OFFICE VISIT (OUTPATIENT)
Dept: GYNECOLOGY | Facility: CLINIC | Age: 52
End: 2022-06-02
Payer: COMMERCIAL

## 2022-06-02 VITALS
BODY MASS INDEX: 27.59 KG/M2 | HEIGHT: 65 IN | SYSTOLIC BLOOD PRESSURE: 120 MMHG | DIASTOLIC BLOOD PRESSURE: 78 MMHG | HEART RATE: 69 BPM | WEIGHT: 165.6 LBS

## 2022-06-02 DIAGNOSIS — Z01.419 ENCOUNTER FOR GYNECOLOGICAL EXAMINATION (GENERAL) (ROUTINE) WITHOUT ABNORMAL FINDINGS: Primary | ICD-10-CM

## 2022-06-02 PROCEDURE — 99396 PREV VISIT EST AGE 40-64: CPT | Performed by: OBSTETRICS & GYNECOLOGY

## 2023-06-06 NOTE — PROGRESS NOTES
"Assessment/Plan:    Recommended monthly SBE, annual CBE and annual screening mammo  ASCCP guidelines reviewed and pap with cotesting noted to be up to date; this low risk patient was advised she meets criteria to d/c pap screening at age 70  No pap done today  Colonoscopy noted to be up to date  The patient denies STI risk factors and declines testing at this time  Discussed postmenopausal considerations and symptoms to report  Kegel exercises as instructed  RTO in one year for routine annual gyn exam or sooner PRN  Diagnoses and all orders for this visit:    Encounter for gynecological examination (general) (routine) without abnormal findings        Subjective:      Patient ID: Alison Cosme is a 52 y o  female  This patient presents for routine annual gyn exam   She denies  bleeding or spotting, VM sx, pelvic pain, dyspareunia, breast concerns, abnormal discharge, bowel/bladder dysfunction  , sexually active and is monogam  ous  Pap/HPV up to date and normal, 5/21/21, 2020  Has hx of ASCUS pap with +HPV in 2017 with BRANDON I on colpo  Mammography up to date and normal, 10/12/20  Colonoscopy up to date, 3/29/19  Due again after 5 years  The following portions of the patient's history were reviewed and updated as appropriate: allergies, current medications, past family history, past medical history, past social history, past surgical history and problem list     Review of Systems   Constitutional: Negative  Respiratory: Negative  Cardiovascular: Negative  Gastrointestinal: Negative  Endocrine: Negative  Genitourinary: Negative for dyspareunia, dysuria, frequency, pelvic pain, urgency, vaginal bleeding, vaginal discharge and vaginal pain  Musculoskeletal: Negative  Skin: Negative  Neurological: Negative  Psychiatric/Behavioral: Negative            Objective:      /80   Pulse 60   Ht 5' 5 25\" (1 657 m)   Wt 77 6 kg (171 lb)   LMP 02/09/2019   " BMI 28 24 kg/m²          Physical Exam  Vitals and nursing note reviewed  Exam conducted with a chaperone present  Constitutional:       Appearance: Normal appearance  She is well-developed  HENT:      Head: Normocephalic and atraumatic  Neck:      Thyroid: No thyroid mass or thyromegaly  Cardiovascular:      Rate and Rhythm: Normal rate and regular rhythm  Heart sounds: Normal heart sounds  Pulmonary:      Effort: Pulmonary effort is normal       Breath sounds: Normal breath sounds  Chest:   Breasts:     Breasts are symmetrical       Right: No inverted nipple, mass, nipple discharge, skin change or tenderness  Left: No inverted nipple, mass, nipple discharge, skin change or tenderness  Abdominal:      General: Bowel sounds are normal       Palpations: Abdomen is soft  Tenderness: There is no abdominal tenderness  Hernia: There is no hernia in the left inguinal area or right inguinal area  Genitourinary:     General: Normal vulva  Exam position: Supine  Pubic Area: No rash  Labia:         Right: No rash, tenderness, lesion or injury  Left: No rash, tenderness, lesion or injury  Urethra: No prolapse, urethral pain, urethral swelling or urethral lesion  Vagina: Normal  No signs of injury and foreign body  No vaginal discharge, erythema, tenderness, bleeding, lesions or prolapsed vaginal walls  Cervix: No cervical motion tenderness, discharge, friability, lesion, erythema, cervical bleeding or eversion  Uterus: Not deviated, not enlarged, not fixed, not tender and no uterine prolapse  Adnexa:         Right: No mass, tenderness or fullness  Left: No mass, tenderness or fullness  Rectum: No external hemorrhoid  Comments: Urethra normal without lesions  No bladder tenderness  Musculoskeletal:         General: Normal range of motion  Cervical back: Normal range of motion and neck supple     Lymphadenopathy: Lower Body: No right inguinal adenopathy  No left inguinal adenopathy  Skin:     General: Skin is warm and dry  Neurological:      Mental Status: She is alert and oriented to person, place, and time  Psychiatric:         Speech: Speech normal          Behavior: Behavior normal  Behavior is cooperative

## 2023-06-07 ENCOUNTER — OFFICE VISIT (OUTPATIENT)
Dept: GYNECOLOGY | Facility: CLINIC | Age: 53
End: 2023-06-07
Payer: COMMERCIAL

## 2023-06-07 VITALS
DIASTOLIC BLOOD PRESSURE: 80 MMHG | WEIGHT: 171 LBS | HEIGHT: 65 IN | HEART RATE: 60 BPM | SYSTOLIC BLOOD PRESSURE: 128 MMHG | BODY MASS INDEX: 28.49 KG/M2

## 2023-06-07 DIAGNOSIS — Z01.419 ENCOUNTER FOR GYNECOLOGICAL EXAMINATION (GENERAL) (ROUTINE) WITHOUT ABNORMAL FINDINGS: Primary | ICD-10-CM

## 2023-06-07 PROCEDURE — 99396 PREV VISIT EST AGE 40-64: CPT | Performed by: OBSTETRICS & GYNECOLOGY

## 2024-06-04 NOTE — PROGRESS NOTES
Assessment/Plan:    Recommended monthly SBE, annual CBE and annual screening mammo. ASCCP guidelines reviewed and pap with cotesting noted to be up to date; this low risk patient was advised she meets criteria to d/c pap screening at age 63. Pap done. Colonoscopy noted to be up to date.  Reviewed diet/activity recommendations Calcium 1200 mg and Vit D 600-1000 IU daily.  Discussed postmenopausal considerations and symptoms to report. Kegel exercises as instructed. RTO in one year for routine annual gyn exam or sooner PRN.        Diagnoses and all orders for this visit:    Encounter for gynecological examination (general) (routine) without abnormal findings        Subjective:      Patient ID: Katina Heath is a 54 y.o. female.    This patient presents for routine annual gyn exam.  She denies  bleeding or spotting, VM sx, pelvic pain, dyspareunia, breast concerns, abnormal discharge, bowel/bladder dysfunction.   , sexually active and is monogamous.   Pap/HPV up to date and normal, 5/21/21, 2020. Has hx of ASCUS pap with +HPV in 2017 with BRANDON I on colpo.  Mammography up to date and normal, 1/8/24.  Colonoscopy up to date, 3/29/19. Due again after 5 years.           The following portions of the patient's history were reviewed and updated as appropriate: allergies, current medications, past family history, past medical history, past social history, past surgical history and problem list.    Review of Systems   Constitutional: Negative.    Respiratory: Negative.     Cardiovascular: Negative.    Gastrointestinal: Negative.    Endocrine: Negative.    Genitourinary:  Negative for dyspareunia, dysuria, frequency, pelvic pain, urgency, vaginal bleeding, vaginal discharge and vaginal pain.   Musculoskeletal: Negative.    Skin: Negative.    Neurological: Negative.    Psychiatric/Behavioral: Negative.           Objective:      BP 96/60 (BP Location: Left arm, Patient Position: Sitting, Cuff Size: Standard)    "Ht 5' 5.25\" (1.657 m)   Wt 75.8 kg (167 lb)   LMP 02/09/2019   BMI 27.58 kg/m²          Physical Exam  Vitals and nursing note reviewed. Exam conducted with a chaperone present.   Constitutional:       Appearance: Normal appearance. She is well-developed.   HENT:      Head: Normocephalic and atraumatic.   Neck:      Thyroid: No thyroid mass or thyromegaly.   Cardiovascular:      Rate and Rhythm: Normal rate and regular rhythm.      Heart sounds: Normal heart sounds.   Pulmonary:      Effort: Pulmonary effort is normal.      Breath sounds: Normal breath sounds.   Chest:   Breasts:     Breasts are symmetrical.      Right: No inverted nipple, mass, nipple discharge, skin change or tenderness.      Left: No inverted nipple, mass, nipple discharge, skin change or tenderness.   Abdominal:      General: Bowel sounds are normal.      Palpations: Abdomen is soft.      Tenderness: There is no abdominal tenderness.      Hernia: There is no hernia in the left inguinal area or right inguinal area.   Genitourinary:     General: Normal vulva.      Exam position: Supine.      Pubic Area: No rash.       Labia:         Right: No rash, tenderness, lesion or injury.         Left: No rash, tenderness, lesion or injury.       Urethra: No prolapse, urethral pain, urethral swelling or urethral lesion.      Vagina: Normal. No signs of injury and foreign body. No vaginal discharge, erythema, tenderness, bleeding, lesions or prolapsed vaginal walls.      Cervix: No cervical motion tenderness, discharge, friability, lesion, erythema, cervical bleeding or eversion.      Uterus: Not deviated, not enlarged, not fixed, not tender and no uterine prolapse.       Adnexa:         Right: No mass, tenderness or fullness.          Left: No mass, tenderness or fullness.        Rectum: No external hemorrhoid.      Comments: Urethra normal without lesions  No bladder tenderness  Musculoskeletal:         General: Normal range of motion.      Cervical " back: Normal range of motion and neck supple.   Lymphadenopathy:      Lower Body: No right inguinal adenopathy. No left inguinal adenopathy.   Skin:     General: Skin is warm and dry.   Neurological:      Mental Status: She is alert and oriented to person, place, and time.   Psychiatric:         Speech: Speech normal.         Behavior: Behavior normal. Behavior is cooperative.

## 2024-06-10 ENCOUNTER — ANNUAL EXAM (OUTPATIENT)
Dept: GYNECOLOGY | Facility: CLINIC | Age: 54
End: 2024-06-10
Payer: COMMERCIAL

## 2024-06-10 VITALS
HEIGHT: 65 IN | SYSTOLIC BLOOD PRESSURE: 96 MMHG | DIASTOLIC BLOOD PRESSURE: 60 MMHG | BODY MASS INDEX: 27.82 KG/M2 | WEIGHT: 167 LBS

## 2024-06-10 DIAGNOSIS — Z12.4 ENCOUNTER FOR PAPANICOLAOU SMEAR FOR CERVICAL CANCER SCREENING: ICD-10-CM

## 2024-06-10 DIAGNOSIS — Z01.419 ENCOUNTER FOR GYNECOLOGICAL EXAMINATION (GENERAL) (ROUTINE) WITHOUT ABNORMAL FINDINGS: Primary | ICD-10-CM

## 2024-06-10 PROCEDURE — G0476 HPV COMBO ASSAY CA SCREEN: HCPCS | Performed by: OBSTETRICS & GYNECOLOGY

## 2024-06-10 PROCEDURE — S0612 ANNUAL GYNECOLOGICAL EXAMINA: HCPCS | Performed by: OBSTETRICS & GYNECOLOGY

## 2024-06-10 PROCEDURE — G0145 SCR C/V CYTO,THINLAYER,RESCR: HCPCS | Performed by: OBSTETRICS & GYNECOLOGY

## 2024-06-14 LAB
LAB AP GYN PRIMARY INTERPRETATION: NORMAL
Lab: NORMAL